# Patient Record
Sex: MALE | Race: WHITE | NOT HISPANIC OR LATINO | Employment: OTHER | URBAN - METROPOLITAN AREA
[De-identification: names, ages, dates, MRNs, and addresses within clinical notes are randomized per-mention and may not be internally consistent; named-entity substitution may affect disease eponyms.]

---

## 2017-01-10 ENCOUNTER — FOLLOW UP AND POST INJECTION EVALUATION (OUTPATIENT)
Dept: URBAN - METROPOLITAN AREA CLINIC 26 | Facility: CLINIC | Age: 82
End: 2017-01-10

## 2017-01-10 VITALS
HEART RATE: 59 BPM | BODY MASS INDEX: 23.35 KG/M2 | SYSTOLIC BLOOD PRESSURE: 115 MMHG | HEIGHT: 60 IN | DIASTOLIC BLOOD PRESSURE: 81 MMHG

## 2017-01-10 DIAGNOSIS — H04.123: ICD-10-CM

## 2017-01-10 DIAGNOSIS — H02.836: ICD-10-CM

## 2017-01-10 DIAGNOSIS — H25.12: ICD-10-CM

## 2017-01-10 DIAGNOSIS — H02.833: ICD-10-CM

## 2017-01-10 DIAGNOSIS — H35.3122: ICD-10-CM

## 2017-01-10 DIAGNOSIS — H18.59: ICD-10-CM

## 2017-01-10 DIAGNOSIS — H35.3211: ICD-10-CM

## 2017-01-10 PROCEDURE — 92250 FUNDUS PHOTOGRAPHY W/I&R: CPT

## 2017-01-10 PROCEDURE — 67028 INJECTION EYE DRUG: CPT

## 2017-01-10 PROCEDURE — 92235 FLUORESCEIN ANGRPH MLTIFRAME: CPT

## 2017-01-10 PROCEDURE — G8420 CALC BMI NORM PARAMETERS: HCPCS

## 2017-01-10 PROCEDURE — 4177F TALK PT/CRGVR RE AREDS PREV: CPT

## 2017-01-10 PROCEDURE — 1036F TOBACCO NON-USER: CPT

## 2017-01-10 PROCEDURE — 92014 COMPRE OPH EXAM EST PT 1/>: CPT

## 2017-01-10 PROCEDURE — 2019F DILATED MACUL EXAM DONE: CPT

## 2017-01-10 ASSESSMENT — TONOMETRY
OS_IOP_MMHG: 13
OD_IOP_MMHG: 9

## 2017-01-10 ASSESSMENT — VISUAL ACUITY
OS_SC: 20/30+1
OD_SC: 20/25-3

## 2017-03-07 ENCOUNTER — CLINIC PROCEDURE ONLY (OUTPATIENT)
Dept: URBAN - METROPOLITAN AREA CLINIC 26 | Facility: CLINIC | Age: 82
End: 2017-03-07

## 2017-03-07 DIAGNOSIS — H35.3211: ICD-10-CM

## 2017-03-07 PROCEDURE — 67028 INJECTION EYE DRUG: CPT

## 2017-03-07 ASSESSMENT — VISUAL ACUITY: OD_SC: 20/25

## 2017-03-07 ASSESSMENT — TONOMETRY: OD_IOP_MMHG: 15

## 2017-05-01 ENCOUNTER — FOLLOW UP (OUTPATIENT)
Dept: URBAN - METROPOLITAN AREA CLINIC 26 | Facility: CLINIC | Age: 82
End: 2017-05-01

## 2017-05-01 VITALS — HEART RATE: 60 BPM | SYSTOLIC BLOOD PRESSURE: 131 MMHG | DIASTOLIC BLOOD PRESSURE: 58 MMHG | HEIGHT: 60 IN

## 2017-05-01 DIAGNOSIS — H18.59: ICD-10-CM

## 2017-05-01 DIAGNOSIS — H25.12: ICD-10-CM

## 2017-05-01 DIAGNOSIS — H35.3211: ICD-10-CM

## 2017-05-01 DIAGNOSIS — H35.3122: ICD-10-CM

## 2017-05-01 DIAGNOSIS — H02.836: ICD-10-CM

## 2017-05-01 DIAGNOSIS — H02.833: ICD-10-CM

## 2017-05-01 DIAGNOSIS — H04.123: ICD-10-CM

## 2017-05-01 PROCEDURE — 2019F DILATED MACUL EXAM DONE: CPT

## 2017-05-01 PROCEDURE — 92014 COMPRE OPH EXAM EST PT 1/>: CPT

## 2017-05-01 PROCEDURE — 1036F TOBACCO NON-USER: CPT

## 2017-05-01 PROCEDURE — 92235 FLUORESCEIN ANGRPH MLTIFRAME: CPT

## 2017-05-01 PROCEDURE — 92250 FUNDUS PHOTOGRAPHY W/I&R: CPT

## 2017-05-01 PROCEDURE — 4177F TALK PT/CRGVR RE AREDS PREV: CPT

## 2017-05-01 PROCEDURE — G8427 DOCREV CUR MEDS BY ELIG CLIN: HCPCS

## 2017-05-01 PROCEDURE — 67028 INJECTION EYE DRUG: CPT

## 2017-05-01 ASSESSMENT — TONOMETRY
OS_IOP_MMHG: 13
OD_IOP_MMHG: 10

## 2017-05-01 ASSESSMENT — VISUAL ACUITY
OD_SC: 20/25
OS_SC: 20/25-1

## 2017-06-21 ENCOUNTER — CLINIC PROCEDURE ONLY (OUTPATIENT)
Dept: URBAN - METROPOLITAN AREA CLINIC 26 | Facility: CLINIC | Age: 82
End: 2017-06-21

## 2017-06-21 DIAGNOSIS — H35.3211: ICD-10-CM

## 2017-06-21 PROCEDURE — 67028 INJECTION EYE DRUG: CPT

## 2017-06-21 ASSESSMENT — VISUAL ACUITY: OD_SC: 20/20-3

## 2017-06-21 ASSESSMENT — TONOMETRY: OD_IOP_MMHG: 12

## 2017-08-17 ENCOUNTER — FOLLOW UP AND POST INJECTION EVALUATION (OUTPATIENT)
Dept: URBAN - METROPOLITAN AREA CLINIC 26 | Facility: CLINIC | Age: 82
End: 2017-08-17

## 2017-08-17 VITALS — HEIGHT: 60 IN | DIASTOLIC BLOOD PRESSURE: 66 MMHG | HEART RATE: 61 BPM | SYSTOLIC BLOOD PRESSURE: 124 MMHG

## 2017-08-17 DIAGNOSIS — H35.3211: ICD-10-CM

## 2017-08-17 DIAGNOSIS — H18.59: ICD-10-CM

## 2017-08-17 DIAGNOSIS — H35.3122: ICD-10-CM

## 2017-08-17 PROCEDURE — 92014 COMPRE OPH EXAM EST PT 1/>: CPT

## 2017-08-17 PROCEDURE — 1036F TOBACCO NON-USER: CPT

## 2017-08-17 PROCEDURE — 67028 INJECTION EYE DRUG: CPT

## 2017-08-17 PROCEDURE — 2019F DILATED MACUL EXAM DONE: CPT

## 2017-08-17 PROCEDURE — 4177F TALK PT/CRGVR RE AREDS PREV: CPT

## 2017-08-17 PROCEDURE — 92250 FUNDUS PHOTOGRAPHY W/I&R: CPT

## 2017-08-17 PROCEDURE — G8427 DOCREV CUR MEDS BY ELIG CLIN: HCPCS

## 2017-08-17 ASSESSMENT — VISUAL ACUITY
OD_SC: 20/20-1
OS_SC: 20/25-1

## 2017-08-17 ASSESSMENT — TONOMETRY
OD_IOP_MMHG: 7
OS_IOP_MMHG: 9

## 2017-10-30 ENCOUNTER — CLINICAL PROCEDURE AND DIAGNOSTIC TESTING ONLY (OUTPATIENT)
Dept: URBAN - METROPOLITAN AREA CLINIC 26 | Facility: CLINIC | Age: 82
End: 2017-10-30

## 2017-10-30 DIAGNOSIS — H35.3122: ICD-10-CM

## 2017-10-30 DIAGNOSIS — H35.3211: ICD-10-CM

## 2017-10-30 PROCEDURE — 92134 CPTRZ OPH DX IMG PST SGM RTA: CPT

## 2017-10-30 PROCEDURE — 67028 INJECTION EYE DRUG: CPT

## 2017-10-30 ASSESSMENT — TONOMETRY: OD_IOP_MMHG: 9

## 2017-10-30 ASSESSMENT — VISUAL ACUITY: OD_SC: 20/25

## 2018-01-22 ENCOUNTER — FOLLOW UP AND POST INJECTION EVALUATION (OUTPATIENT)
Dept: URBAN - METROPOLITAN AREA CLINIC 26 | Facility: CLINIC | Age: 83
End: 2018-01-22

## 2018-01-22 VITALS
HEART RATE: 60 BPM | SYSTOLIC BLOOD PRESSURE: 103 MMHG | DIASTOLIC BLOOD PRESSURE: 71 MMHG | HEIGHT: 74 IN | WEIGHT: 175 LBS | BODY MASS INDEX: 22.46 KG/M2

## 2018-01-22 DIAGNOSIS — H02.836: ICD-10-CM

## 2018-01-22 DIAGNOSIS — H35.3211: ICD-10-CM

## 2018-01-22 DIAGNOSIS — H18.59: ICD-10-CM

## 2018-01-22 DIAGNOSIS — H04.123: ICD-10-CM

## 2018-01-22 DIAGNOSIS — H02.833: ICD-10-CM

## 2018-01-22 DIAGNOSIS — H25.12: ICD-10-CM

## 2018-01-22 DIAGNOSIS — H35.3122: ICD-10-CM

## 2018-01-22 PROCEDURE — 92134 CPTRZ OPH DX IMG PST SGM RTA: CPT

## 2018-01-22 PROCEDURE — 67028 INJECTION EYE DRUG: CPT

## 2018-01-22 PROCEDURE — 92014 COMPRE OPH EXAM EST PT 1/>: CPT

## 2018-01-22 PROCEDURE — 92235 FLUORESCEIN ANGRPH MLTIFRAME: CPT

## 2018-01-22 PROCEDURE — 92250 FUNDUS PHOTOGRAPHY W/I&R: CPT

## 2018-01-22 ASSESSMENT — TONOMETRY
OS_IOP_MMHG: 8
OD_IOP_MMHG: 8

## 2018-01-22 ASSESSMENT — VISUAL ACUITY
OD_SC: 20/20-2
OS_SC: 20/20-1

## 2018-04-16 ENCOUNTER — CLINICAL PROCEDURE AND DIAGNOSTIC TESTING ONLY (OUTPATIENT)
Dept: URBAN - METROPOLITAN AREA CLINIC 26 | Facility: CLINIC | Age: 83
End: 2018-04-16

## 2018-04-16 DIAGNOSIS — H35.3211: ICD-10-CM

## 2018-04-16 DIAGNOSIS — H35.3122: ICD-10-CM

## 2018-04-16 PROCEDURE — 92134 CPTRZ OPH DX IMG PST SGM RTA: CPT

## 2018-04-16 PROCEDURE — 67028 INJECTION EYE DRUG: CPT

## 2018-04-16 PROCEDURE — 92250 FUNDUS PHOTOGRAPHY W/I&R: CPT

## 2018-04-16 ASSESSMENT — VISUAL ACUITY: OD_SC: 20/30-

## 2018-04-16 ASSESSMENT — TONOMETRY: OD_IOP_MMHG: 15

## 2018-07-09 ENCOUNTER — FOLLOW UP AND POST INJECTION EVALUATION (OUTPATIENT)
Dept: URBAN - METROPOLITAN AREA CLINIC 26 | Facility: CLINIC | Age: 83
End: 2018-07-09

## 2018-07-09 VITALS — HEART RATE: 60 BPM | DIASTOLIC BLOOD PRESSURE: 61 MMHG | HEIGHT: 60 IN | SYSTOLIC BLOOD PRESSURE: 117 MMHG

## 2018-07-09 DIAGNOSIS — H02.833: ICD-10-CM

## 2018-07-09 DIAGNOSIS — H04.123: ICD-10-CM

## 2018-07-09 DIAGNOSIS — H18.59: ICD-10-CM

## 2018-07-09 DIAGNOSIS — H35.3211: ICD-10-CM

## 2018-07-09 DIAGNOSIS — H02.836: ICD-10-CM

## 2018-07-09 DIAGNOSIS — H35.3122: ICD-10-CM

## 2018-07-09 DIAGNOSIS — H25.12: ICD-10-CM

## 2018-07-09 PROCEDURE — 92250 FUNDUS PHOTOGRAPHY W/I&R: CPT

## 2018-07-09 PROCEDURE — 92235 FLUORESCEIN ANGRPH MLTIFRAME: CPT

## 2018-07-09 PROCEDURE — 92014 COMPRE OPH EXAM EST PT 1/>: CPT

## 2018-07-09 PROCEDURE — 92134 CPTRZ OPH DX IMG PST SGM RTA: CPT

## 2018-07-09 PROCEDURE — 67028 INJECTION EYE DRUG: CPT

## 2018-07-09 ASSESSMENT — VISUAL ACUITY
OD_SC: 20/20
OS_SC: 20/25

## 2018-07-09 ASSESSMENT — TONOMETRY
OS_IOP_MMHG: 9
OD_IOP_MMHG: 10

## 2018-09-17 ENCOUNTER — CLINICAL PROCEDURE AND DIAGNOSTIC TESTING ONLY (OUTPATIENT)
Dept: URBAN - METROPOLITAN AREA CLINIC 26 | Facility: CLINIC | Age: 83
End: 2018-09-17

## 2018-09-17 DIAGNOSIS — H35.3122: ICD-10-CM

## 2018-09-17 DIAGNOSIS — H35.3211: ICD-10-CM

## 2018-09-17 PROCEDURE — 92250 FUNDUS PHOTOGRAPHY W/I&R: CPT

## 2018-09-17 PROCEDURE — 92134 CPTRZ OPH DX IMG PST SGM RTA: CPT

## 2018-09-17 PROCEDURE — 67028 INJECTION EYE DRUG: CPT

## 2018-09-17 ASSESSMENT — TONOMETRY: OD_IOP_MMHG: 15

## 2018-09-17 ASSESSMENT — VISUAL ACUITY: OD_SC: 20/20-

## 2018-11-26 ENCOUNTER — CLINICAL PROCEDURE AND DIAGNOSTIC TESTING ONLY (OUTPATIENT)
Dept: URBAN - METROPOLITAN AREA CLINIC 26 | Facility: CLINIC | Age: 83
End: 2018-11-26

## 2018-11-26 DIAGNOSIS — H35.3211: ICD-10-CM

## 2018-11-26 DIAGNOSIS — H35.3122: ICD-10-CM

## 2018-11-26 PROCEDURE — 67028 INJECTION EYE DRUG: CPT

## 2018-11-26 PROCEDURE — 92250 FUNDUS PHOTOGRAPHY W/I&R: CPT

## 2018-11-26 ASSESSMENT — VISUAL ACUITY: OD_SC: 20/20

## 2018-11-26 ASSESSMENT — TONOMETRY: OD_IOP_MMHG: 14

## 2019-02-05 ENCOUNTER — FOLLOW UP AND POST INJECTION EVALUATION (OUTPATIENT)
Dept: URBAN - METROPOLITAN AREA CLINIC 26 | Facility: CLINIC | Age: 84
End: 2019-02-05

## 2019-02-05 VITALS — WEIGHT: 173 LBS | HEIGHT: 73 IN | BODY MASS INDEX: 22.93 KG/M2

## 2019-02-05 DIAGNOSIS — H02.833: ICD-10-CM

## 2019-02-05 DIAGNOSIS — H02.836: ICD-10-CM

## 2019-02-05 DIAGNOSIS — H04.123: ICD-10-CM

## 2019-02-05 DIAGNOSIS — H18.59: ICD-10-CM

## 2019-02-05 DIAGNOSIS — H35.3211: ICD-10-CM

## 2019-02-05 DIAGNOSIS — H35.3122: ICD-10-CM

## 2019-02-05 DIAGNOSIS — H25.12: ICD-10-CM

## 2019-02-05 PROCEDURE — 92250 FUNDUS PHOTOGRAPHY W/I&R: CPT

## 2019-02-05 PROCEDURE — 92134 CPTRZ OPH DX IMG PST SGM RTA: CPT

## 2019-02-05 PROCEDURE — 67028 INJECTION EYE DRUG: CPT

## 2019-02-05 PROCEDURE — 92014 COMPRE OPH EXAM EST PT 1/>: CPT

## 2019-02-05 ASSESSMENT — TONOMETRY
OS_IOP_MMHG: 10
OD_IOP_MMHG: 11

## 2019-02-05 ASSESSMENT — VISUAL ACUITY
OS_SC: 20/20-
OD_SC: 20/25+2

## 2019-04-16 ENCOUNTER — CLINICAL PROCEDURE AND DIAGNOSTIC TESTING ONLY (OUTPATIENT)
Dept: URBAN - METROPOLITAN AREA CLINIC 26 | Facility: CLINIC | Age: 84
End: 2019-04-16

## 2019-04-16 DIAGNOSIS — H35.3211: ICD-10-CM

## 2019-04-16 DIAGNOSIS — H35.3122: ICD-10-CM

## 2019-04-16 PROCEDURE — 92250 FUNDUS PHOTOGRAPHY W/I&R: CPT

## 2019-04-16 PROCEDURE — 92134 CPTRZ OPH DX IMG PST SGM RTA: CPT

## 2019-04-16 PROCEDURE — 67028 INJECTION EYE DRUG: CPT

## 2019-04-16 ASSESSMENT — VISUAL ACUITY
OS_SC: 20/25
OD_SC: 20/25

## 2019-04-16 ASSESSMENT — TONOMETRY
OD_IOP_MMHG: 10
OS_IOP_MMHG: 12

## 2019-07-02 ENCOUNTER — CLINICAL PROCEDURE AND DIAGNOSTIC TESTING ONLY (OUTPATIENT)
Dept: URBAN - METROPOLITAN AREA CLINIC 26 | Facility: CLINIC | Age: 84
End: 2019-07-02

## 2019-07-02 DIAGNOSIS — H35.3122: ICD-10-CM

## 2019-07-02 DIAGNOSIS — H35.3211: ICD-10-CM

## 2019-07-02 PROCEDURE — 92250 FUNDUS PHOTOGRAPHY W/I&R: CPT

## 2019-07-02 PROCEDURE — 67028 INJECTION EYE DRUG: CPT

## 2019-07-02 ASSESSMENT — VISUAL ACUITY: OD_SC: 20/20-2

## 2019-07-02 ASSESSMENT — TONOMETRY: OD_IOP_MMHG: 10

## 2019-09-11 ENCOUNTER — FOLLOW UP AND POST INJECTION EVALUATION (OUTPATIENT)
Dept: URBAN - METROPOLITAN AREA CLINIC 26 | Facility: CLINIC | Age: 84
End: 2019-09-11

## 2019-09-11 VITALS — DIASTOLIC BLOOD PRESSURE: 62 MMHG | HEIGHT: 60 IN | SYSTOLIC BLOOD PRESSURE: 116 MMHG | HEART RATE: 60 BPM

## 2019-09-11 DIAGNOSIS — H25.12: ICD-10-CM

## 2019-09-11 DIAGNOSIS — H02.833: ICD-10-CM

## 2019-09-11 DIAGNOSIS — H35.3122: ICD-10-CM

## 2019-09-11 DIAGNOSIS — H04.123: ICD-10-CM

## 2019-09-11 DIAGNOSIS — H18.59: ICD-10-CM

## 2019-09-11 DIAGNOSIS — H02.836: ICD-10-CM

## 2019-09-11 DIAGNOSIS — H35.3211: ICD-10-CM

## 2019-09-11 PROCEDURE — 92235 FLUORESCEIN ANGRPH MLTIFRAME: CPT

## 2019-09-11 PROCEDURE — 92250 FUNDUS PHOTOGRAPHY W/I&R: CPT

## 2019-09-11 PROCEDURE — 67028 INJECTION EYE DRUG: CPT

## 2019-09-11 PROCEDURE — 92014 COMPRE OPH EXAM EST PT 1/>: CPT

## 2019-09-11 PROCEDURE — 92134 CPTRZ OPH DX IMG PST SGM RTA: CPT

## 2019-09-11 ASSESSMENT — VISUAL ACUITY
OS_SC: 20/20-1
OD_SC: 20/20+2

## 2019-09-11 ASSESSMENT — TONOMETRY
OS_IOP_MMHG: 14
OD_IOP_MMHG: 18

## 2019-11-25 ENCOUNTER — CLINICAL PROCEDURE AND DIAGNOSTIC TESTING ONLY (OUTPATIENT)
Dept: URBAN - METROPOLITAN AREA CLINIC 26 | Facility: CLINIC | Age: 84
End: 2019-11-25

## 2019-11-25 DIAGNOSIS — H35.3122: ICD-10-CM

## 2019-11-25 DIAGNOSIS — H35.3211: ICD-10-CM

## 2019-11-25 PROCEDURE — 92250 FUNDUS PHOTOGRAPHY W/I&R: CPT

## 2019-11-25 PROCEDURE — 67028 INJECTION EYE DRUG: CPT

## 2019-11-25 PROCEDURE — 92134 CPTRZ OPH DX IMG PST SGM RTA: CPT

## 2019-11-25 ASSESSMENT — VISUAL ACUITY: OD_SC: 20/20-2

## 2019-11-25 ASSESSMENT — TONOMETRY: OD_IOP_MMHG: 17

## 2020-02-03 ENCOUNTER — CLINICAL PROCEDURE AND DIAGNOSTIC TESTING ONLY (OUTPATIENT)
Dept: URBAN - METROPOLITAN AREA CLINIC 26 | Facility: CLINIC | Age: 85
End: 2020-02-03

## 2020-02-03 DIAGNOSIS — H35.3122: ICD-10-CM

## 2020-02-03 DIAGNOSIS — H35.3211: ICD-10-CM

## 2020-02-03 PROCEDURE — 67028 INJECTION EYE DRUG: CPT

## 2020-02-03 PROCEDURE — 92134 CPTRZ OPH DX IMG PST SGM RTA: CPT

## 2020-02-03 PROCEDURE — 92250 FUNDUS PHOTOGRAPHY W/I&R: CPT

## 2020-02-03 ASSESSMENT — VISUAL ACUITY: OD_CC: 20/20

## 2020-02-03 ASSESSMENT — TONOMETRY: OD_IOP_MMHG: 13

## 2020-04-16 ENCOUNTER — FOLLOW UP AND POST INJECTION EVALUATION (OUTPATIENT)
Dept: URBAN - METROPOLITAN AREA CLINIC 26 | Facility: CLINIC | Age: 85
End: 2020-04-16

## 2020-04-16 VITALS — WEIGHT: 160 LBS | BODY MASS INDEX: 21.2 KG/M2 | HEIGHT: 73 IN

## 2020-04-16 DIAGNOSIS — H18.59: ICD-10-CM

## 2020-04-16 DIAGNOSIS — H35.3122: ICD-10-CM

## 2020-04-16 DIAGNOSIS — G45.3: ICD-10-CM

## 2020-04-16 DIAGNOSIS — H35.3211: ICD-10-CM

## 2020-04-16 PROCEDURE — 67028 INJECTION EYE DRUG: CPT

## 2020-04-16 PROCEDURE — 92134 CPTRZ OPH DX IMG PST SGM RTA: CPT

## 2020-04-16 PROCEDURE — 92014 COMPRE OPH EXAM EST PT 1/>: CPT

## 2020-04-16 PROCEDURE — 92250 FUNDUS PHOTOGRAPHY W/I&R: CPT

## 2020-04-16 ASSESSMENT — TONOMETRY
OD_IOP_MMHG: 14
OS_IOP_MMHG: 10

## 2020-04-16 ASSESSMENT — VISUAL ACUITY
OS_SC: 20/20-2
OD_SC: 20/20-2

## 2020-06-08 ENCOUNTER — CLINICAL PROCEDURE AND DIAGNOSTIC TESTING ONLY (OUTPATIENT)
Dept: URBAN - METROPOLITAN AREA CLINIC 26 | Facility: CLINIC | Age: 85
End: 2020-06-08

## 2020-06-08 ENCOUNTER — ADDENDUM (OUTPATIENT)
Dept: URBAN - METROPOLITAN AREA CLINIC 26 | Facility: CLINIC | Age: 85
End: 2020-06-08

## 2020-06-08 DIAGNOSIS — H35.3211: ICD-10-CM

## 2020-06-08 DIAGNOSIS — H35.3122: ICD-10-CM

## 2020-06-08 PROCEDURE — 92250 FUNDUS PHOTOGRAPHY W/I&R: CPT

## 2020-06-08 PROCEDURE — 67028 INJECTION EYE DRUG: CPT

## 2020-06-08 ASSESSMENT — VISUAL ACUITY: OD_SC: 20/20-

## 2020-06-08 ASSESSMENT — TONOMETRY: OD_IOP_MMHG: 16

## 2020-06-26 NOTE — PATIENT DISCUSSION
Proceed with OD first. EYE OD, IOL TYPE Toric lens, POST OPERATIVE TARGET PL/-0.50, PACKAGE Custom w/ Istent.

## 2020-06-26 NOTE — PATIENT DISCUSSION
consider OS to follow. EYE OS, IOL TYPE Toric lens, POST OPERATIVE TARGET PL/-0.50, PACKAGE Custom w/ Istent.

## 2020-06-26 NOTE — PATIENT DISCUSSION
Recommend Istent at time of cataract surgery OU to further protect optic nerve and better control IOP. Will consider discontinuing Glaucoma drops after surgery if IOP is stable; will continue Glaucoma drops until post op drops are completed.

## 2020-07-21 NOTE — PATIENT DISCUSSION
Continue lumigan qhs OU. Advised to stop combigan 1 week prior to next visit. Drop instructions written and given to pt.

## 2020-08-17 ENCOUNTER — CLINICAL PROCEDURE AND DIAGNOSTIC TESTING ONLY (OUTPATIENT)
Dept: URBAN - METROPOLITAN AREA CLINIC 26 | Facility: CLINIC | Age: 85
End: 2020-08-17

## 2020-08-17 DIAGNOSIS — H35.3211: ICD-10-CM

## 2020-08-17 DIAGNOSIS — H35.3122: ICD-10-CM

## 2020-08-17 PROCEDURE — 67028 INJECTION EYE DRUG: CPT

## 2020-08-17 PROCEDURE — 92134 CPTRZ OPH DX IMG PST SGM RTA: CPT

## 2020-08-17 PROCEDURE — 92250 FUNDUS PHOTOGRAPHY W/I&R: CPT

## 2020-08-17 ASSESSMENT — TONOMETRY: OD_IOP_MMHG: 16

## 2020-08-17 ASSESSMENT — VISUAL ACUITY: OD_SC: 20/25+2

## 2020-10-27 ENCOUNTER — FOLLOW UP AND POST INJECTION EVALUATION (OUTPATIENT)
Dept: URBAN - METROPOLITAN AREA CLINIC 26 | Facility: CLINIC | Age: 85
End: 2020-10-27

## 2020-10-27 DIAGNOSIS — H04.123: ICD-10-CM

## 2020-10-27 DIAGNOSIS — G45.3: ICD-10-CM

## 2020-10-27 DIAGNOSIS — H35.3211: ICD-10-CM

## 2020-10-27 DIAGNOSIS — H02.833: ICD-10-CM

## 2020-10-27 DIAGNOSIS — H02.836: ICD-10-CM

## 2020-10-27 DIAGNOSIS — H18.599: ICD-10-CM

## 2020-10-27 DIAGNOSIS — H25.12: ICD-10-CM

## 2020-10-27 DIAGNOSIS — H35.3122: ICD-10-CM

## 2020-10-27 PROCEDURE — 67028 INJECTION EYE DRUG: CPT

## 2020-10-27 PROCEDURE — 92134 CPTRZ OPH DX IMG PST SGM RTA: CPT

## 2020-10-27 PROCEDURE — 92014 COMPRE OPH EXAM EST PT 1/>: CPT

## 2020-10-27 PROCEDURE — 92250 FUNDUS PHOTOGRAPHY W/I&R: CPT

## 2020-10-27 ASSESSMENT — VISUAL ACUITY
OD_SC: 20/20
OS_SC: 20/20-1

## 2020-10-27 ASSESSMENT — TONOMETRY
OS_IOP_MMHG: 15
OD_IOP_MMHG: 17

## 2021-01-07 ENCOUNTER — CLINICAL PROCEDURE AND DIAGNOSTIC TESTING ONLY (OUTPATIENT)
Dept: URBAN - METROPOLITAN AREA CLINIC 26 | Facility: CLINIC | Age: 86
End: 2021-01-07

## 2021-01-07 DIAGNOSIS — H35.3211: ICD-10-CM

## 2021-01-07 DIAGNOSIS — G45.3: ICD-10-CM

## 2021-01-07 DIAGNOSIS — H35.3122: ICD-10-CM

## 2021-01-07 PROCEDURE — 67028 INJECTION EYE DRUG: CPT

## 2021-01-07 PROCEDURE — 92134 CPTRZ OPH DX IMG PST SGM RTA: CPT

## 2021-01-07 PROCEDURE — 92250 FUNDUS PHOTOGRAPHY W/I&R: CPT

## 2021-01-07 ASSESSMENT — TONOMETRY: OD_IOP_MMHG: 13

## 2021-01-07 ASSESSMENT — VISUAL ACUITY: OD_SC: 20/25-2

## 2021-03-18 ENCOUNTER — CLINICAL PROCEDURE AND DIAGNOSTIC TESTING ONLY (OUTPATIENT)
Dept: URBAN - METROPOLITAN AREA CLINIC 26 | Facility: CLINIC | Age: 86
End: 2021-03-18

## 2021-03-18 VITALS — HEIGHT: 73 IN | BODY MASS INDEX: 22.53 KG/M2 | WEIGHT: 170 LBS

## 2021-03-18 DIAGNOSIS — H35.3211: ICD-10-CM

## 2021-03-18 DIAGNOSIS — H35.3122: ICD-10-CM

## 2021-03-18 PROCEDURE — 92134 CPTRZ OPH DX IMG PST SGM RTA: CPT

## 2021-03-18 PROCEDURE — 92250 FUNDUS PHOTOGRAPHY W/I&R: CPT

## 2021-03-18 PROCEDURE — 67028 INJECTION EYE DRUG: CPT

## 2021-03-18 ASSESSMENT — TONOMETRY: OD_IOP_MMHG: 09

## 2021-03-18 ASSESSMENT — VISUAL ACUITY: OD_SC: 20/25-1

## 2021-06-03 ENCOUNTER — FOLLOW UP AND POST INJECTION EVALUATION (OUTPATIENT)
Dept: URBAN - METROPOLITAN AREA CLINIC 26 | Facility: CLINIC | Age: 86
End: 2021-06-03

## 2021-06-03 DIAGNOSIS — H35.3122: ICD-10-CM

## 2021-06-03 DIAGNOSIS — G45.3: ICD-10-CM

## 2021-06-03 DIAGNOSIS — H35.3211: ICD-10-CM

## 2021-06-03 DIAGNOSIS — H40.1111: ICD-10-CM

## 2021-06-03 DIAGNOSIS — H18.599: ICD-10-CM

## 2021-06-03 PROCEDURE — 92134 CPTRZ OPH DX IMG PST SGM RTA: CPT

## 2021-06-03 PROCEDURE — 67028 INJECTION EYE DRUG: CPT

## 2021-06-03 PROCEDURE — 92014 COMPRE OPH EXAM EST PT 1/>: CPT

## 2021-06-03 PROCEDURE — 92250 FUNDUS PHOTOGRAPHY W/I&R: CPT

## 2021-06-03 ASSESSMENT — VISUAL ACUITY
OS_SC: 20/25
OD_SC: 20/25+1

## 2021-06-03 ASSESSMENT — TONOMETRY
OD_IOP_MMHG: 12
OS_IOP_MMHG: 13

## 2021-08-12 ENCOUNTER — CLINICAL PROCEDURE AND DIAGNOSTIC TESTING ONLY (OUTPATIENT)
Dept: URBAN - METROPOLITAN AREA CLINIC 26 | Facility: CLINIC | Age: 86
End: 2021-08-12

## 2021-08-12 DIAGNOSIS — H35.3211: ICD-10-CM

## 2021-08-12 DIAGNOSIS — H35.3122: ICD-10-CM

## 2021-08-12 PROCEDURE — 67028 INJECTION EYE DRUG: CPT

## 2021-08-12 PROCEDURE — 92250 FUNDUS PHOTOGRAPHY W/I&R: CPT

## 2021-08-12 PROCEDURE — 92134 CPTRZ OPH DX IMG PST SGM RTA: CPT

## 2021-08-12 ASSESSMENT — VISUAL ACUITY: OD_SC: 20/20-2

## 2021-08-12 ASSESSMENT — TONOMETRY: OD_IOP_MMHG: 6

## 2021-10-21 ENCOUNTER — CLINICAL PROCEDURE AND DIAGNOSTIC TESTING ONLY (OUTPATIENT)
Dept: URBAN - METROPOLITAN AREA CLINIC 26 | Facility: CLINIC | Age: 86
End: 2021-10-21

## 2021-10-21 DIAGNOSIS — H35.3211: ICD-10-CM

## 2021-10-21 DIAGNOSIS — H35.3122: ICD-10-CM

## 2021-10-21 PROCEDURE — 67028 INJECTION EYE DRUG: CPT

## 2021-10-21 PROCEDURE — 92134 CPTRZ OPH DX IMG PST SGM RTA: CPT

## 2021-10-21 PROCEDURE — 92250 FUNDUS PHOTOGRAPHY W/I&R: CPT

## 2021-10-21 ASSESSMENT — TONOMETRY: OD_IOP_MMHG: 12

## 2021-10-21 ASSESSMENT — VISUAL ACUITY: OD_SC: 20/20-2

## 2021-11-16 NOTE — PATIENT DISCUSSION
11/16/21: SLIGHT PROGRESSION ON OCT. VA REMAINS UNCHANGED. WE'LL CONT MONITORING FOR SX AND AG. CALL ASAP IF CHANGES ARISE.

## 2022-01-04 ENCOUNTER — FOLLOW UP (OUTPATIENT)
Dept: URBAN - METROPOLITAN AREA CLINIC 26 | Facility: CLINIC | Age: 87
End: 2022-01-04

## 2022-01-04 VITALS
DIASTOLIC BLOOD PRESSURE: 67 MMHG | SYSTOLIC BLOOD PRESSURE: 123 MMHG | BODY MASS INDEX: 21.87 KG/M2 | WEIGHT: 165 LBS | HEIGHT: 73 IN | HEART RATE: 60 BPM

## 2022-01-04 DIAGNOSIS — H18.599: ICD-10-CM

## 2022-01-04 DIAGNOSIS — H35.3122: ICD-10-CM

## 2022-01-04 DIAGNOSIS — G45.3: ICD-10-CM

## 2022-01-04 DIAGNOSIS — H04.123: ICD-10-CM

## 2022-01-04 DIAGNOSIS — H35.3211: ICD-10-CM

## 2022-01-04 DIAGNOSIS — H40.1111: ICD-10-CM

## 2022-01-04 PROCEDURE — 92134 CPTRZ OPH DX IMG PST SGM RTA: CPT

## 2022-01-04 PROCEDURE — 67028 INJECTION EYE DRUG: CPT

## 2022-01-04 PROCEDURE — 92014 COMPRE OPH EXAM EST PT 1/>: CPT

## 2022-01-04 PROCEDURE — 92235 FLUORESCEIN ANGRPH MLTIFRAME: CPT

## 2022-01-04 PROCEDURE — 92250 FUNDUS PHOTOGRAPHY W/I&R: CPT

## 2022-01-04 ASSESSMENT — TONOMETRY
OS_IOP_MMHG: 11
OD_IOP_MMHG: 9
OS_IOP_MMHG: 9

## 2022-04-01 ENCOUNTER — CLINIC PROCEDURE ONLY (OUTPATIENT)
Dept: URBAN - METROPOLITAN AREA CLINIC 26 | Facility: CLINIC | Age: 87
End: 2022-04-01

## 2022-04-01 DIAGNOSIS — G45.3: ICD-10-CM

## 2022-04-01 DIAGNOSIS — H35.3211: ICD-10-CM

## 2022-04-01 DIAGNOSIS — H35.3122: ICD-10-CM

## 2022-04-01 PROCEDURE — 67028 INJECTION EYE DRUG: CPT

## 2022-04-01 PROCEDURE — 92250 FUNDUS PHOTOGRAPHY W/I&R: CPT

## 2022-04-01 PROCEDURE — 92134 CPTRZ OPH DX IMG PST SGM RTA: CPT

## 2022-04-01 ASSESSMENT — TONOMETRY: OD_IOP_MMHG: 10

## 2022-06-10 ENCOUNTER — CLINIC PROCEDURE ONLY (OUTPATIENT)
Dept: URBAN - METROPOLITAN AREA CLINIC 26 | Facility: CLINIC | Age: 87
End: 2022-06-10

## 2022-06-10 DIAGNOSIS — H35.3122: ICD-10-CM

## 2022-06-10 DIAGNOSIS — H35.3211: ICD-10-CM

## 2022-06-10 PROCEDURE — 92250 FUNDUS PHOTOGRAPHY W/I&R: CPT

## 2022-06-10 PROCEDURE — 67028 INJECTION EYE DRUG: CPT

## 2022-06-10 PROCEDURE — 92134 CPTRZ OPH DX IMG PST SGM RTA: CPT

## 2022-06-21 NOTE — PATIENT DISCUSSION
IOP stable today OS , elevating OD  .  Glaucoma laser surgery is recommended. The risks, benefits, and alternatives to SLT were discussed. The patient elects to proceed with surgery.

## 2022-06-24 NOTE — PATIENT DISCUSSION
6/24/22: UNCHANGED ON OCT.  PT REPORTS NO SX. CONT MONITORING 4 MO OR SOONER PRN GIVEN H/O GLAUCOM AND ASSOCIATED RISK W SX.

## 2022-08-15 NOTE — PATIENT DISCUSSION
IOP stable today OS / OD .  Glaucoma laser surgery SLT completed. Continue to monitor IOP after SLT.

## 2022-08-29 ENCOUNTER — FOLLOW UP (OUTPATIENT)
Dept: URBAN - METROPOLITAN AREA CLINIC 26 | Facility: CLINIC | Age: 87
End: 2022-08-29

## 2022-08-29 DIAGNOSIS — H18.599: ICD-10-CM

## 2022-08-29 DIAGNOSIS — H35.3122: ICD-10-CM

## 2022-08-29 DIAGNOSIS — G45.3: ICD-10-CM

## 2022-08-29 DIAGNOSIS — H40.1111: ICD-10-CM

## 2022-08-29 DIAGNOSIS — H35.3211: ICD-10-CM

## 2022-08-29 DIAGNOSIS — H04.123: ICD-10-CM

## 2022-08-29 PROCEDURE — 67028 INJECTION EYE DRUG: CPT

## 2022-08-29 PROCEDURE — 92250 FUNDUS PHOTOGRAPHY W/I&R: CPT

## 2022-08-29 PROCEDURE — 92014 COMPRE OPH EXAM EST PT 1/>: CPT

## 2022-08-29 PROCEDURE — 92134 CPTRZ OPH DX IMG PST SGM RTA: CPT

## 2022-08-29 ASSESSMENT — VISUAL ACUITY
OD_SC: 20/25-1
OS_SC: 20/20-2

## 2022-08-29 ASSESSMENT — TONOMETRY
OD_IOP_MMHG: 14
OS_IOP_MMHG: 15

## 2022-11-08 ENCOUNTER — CLINIC PROCEDURE ONLY (OUTPATIENT)
Dept: URBAN - METROPOLITAN AREA CLINIC 26 | Facility: CLINIC | Age: 87
End: 2022-11-08

## 2022-11-08 DIAGNOSIS — G45.3: ICD-10-CM

## 2022-11-08 DIAGNOSIS — H35.3122: ICD-10-CM

## 2022-11-08 DIAGNOSIS — H35.3211: ICD-10-CM

## 2022-11-08 PROCEDURE — 92134 CPTRZ OPH DX IMG PST SGM RTA: CPT

## 2022-11-08 PROCEDURE — 92250 FUNDUS PHOTOGRAPHY W/I&R: CPT

## 2022-11-08 PROCEDURE — 67028 INJECTION EYE DRUG: CPT

## 2022-11-08 ASSESSMENT — TONOMETRY: OD_IOP_MMHG: 15

## 2022-11-09 NOTE — PATIENT DISCUSSION
Macular pucker is also known as epiretinal membrane - this is a surface wrinkling of the retina - if progressive it can cause blurred vision, distorted size of images, and crooked lines.

## 2022-11-09 NOTE — PATIENT DISCUSSION
Glaucoma laser surgery SLT completed OD , IOP remains in target . Continue to monitor IOP after SLT.

## 2022-12-16 NOTE — PATIENT DISCUSSION
12/16/22: PT REMAINS ASX. OCT WITH SLIGHT PROGRESSION. PT WOULD LIKE TO WAIT AND PROVIDED PAMPHLET WITH INFO REGARDING SX. AG TO MONITOR AT HOME.

## 2022-12-16 NOTE — PATIENT DISCUSSION
12/16/22: SLIGHTLY PROGRESSED COMPARED TO LAST VISIT. Discussed future YAG LASER Capsulotomy with patient.

## 2022-12-20 NOTE — PATIENT DISCUSSION
IOP high teens . Recommend following up with [facility] for further care and Tx's .

## 2023-01-23 ENCOUNTER — CLINIC PROCEDURE ONLY (OUTPATIENT)
Dept: URBAN - METROPOLITAN AREA CLINIC 26 | Facility: CLINIC | Age: 88
End: 2023-01-23

## 2023-01-23 DIAGNOSIS — H35.3122: ICD-10-CM

## 2023-01-23 DIAGNOSIS — G45.3: ICD-10-CM

## 2023-01-23 DIAGNOSIS — H35.3211: ICD-10-CM

## 2023-01-23 PROCEDURE — 92134 CPTRZ OPH DX IMG PST SGM RTA: CPT

## 2023-01-23 PROCEDURE — 92250 FUNDUS PHOTOGRAPHY W/I&R: CPT

## 2023-01-23 PROCEDURE — 67028 INJECTION EYE DRUG: CPT

## 2023-01-23 ASSESSMENT — TONOMETRY: OD_IOP_MMHG: 11

## 2023-04-03 ENCOUNTER — FOLLOW UP (OUTPATIENT)
Dept: URBAN - METROPOLITAN AREA CLINIC 26 | Facility: CLINIC | Age: 88
End: 2023-04-03

## 2023-04-03 VITALS — WEIGHT: 170 LBS | BODY MASS INDEX: 22.53 KG/M2 | HEIGHT: 73 IN

## 2023-04-03 DIAGNOSIS — H18.599: ICD-10-CM

## 2023-04-03 DIAGNOSIS — H35.3122: ICD-10-CM

## 2023-04-03 DIAGNOSIS — H04.123: ICD-10-CM

## 2023-04-03 DIAGNOSIS — H35.3211: ICD-10-CM

## 2023-04-03 DIAGNOSIS — G45.3: ICD-10-CM

## 2023-04-03 DIAGNOSIS — H40.1111: ICD-10-CM

## 2023-04-03 PROCEDURE — 92134 CPTRZ OPH DX IMG PST SGM RTA: CPT

## 2023-04-03 PROCEDURE — 92014 COMPRE OPH EXAM EST PT 1/>: CPT

## 2023-04-03 PROCEDURE — 67028 INJECTION EYE DRUG: CPT

## 2023-04-03 PROCEDURE — 92250 FUNDUS PHOTOGRAPHY W/I&R: CPT

## 2023-04-03 ASSESSMENT — TONOMETRY
OS_IOP_MMHG: 14
OD_IOP_MMHG: 12

## 2023-04-03 ASSESSMENT — VISUAL ACUITY
OD_SC: 20/25-2
OS_SC: 20/25-2

## 2023-06-12 ENCOUNTER — CLINIC PROCEDURE ONLY (OUTPATIENT)
Dept: URBAN - METROPOLITAN AREA CLINIC 26 | Facility: CLINIC | Age: 88
End: 2023-06-12

## 2023-06-12 DIAGNOSIS — G45.3: ICD-10-CM

## 2023-06-12 DIAGNOSIS — H35.3211: ICD-10-CM

## 2023-06-12 DIAGNOSIS — H35.3122: ICD-10-CM

## 2023-06-12 PROCEDURE — 92134 CPTRZ OPH DX IMG PST SGM RTA: CPT

## 2023-06-12 PROCEDURE — 67028 INJECTION EYE DRUG: CPT

## 2023-06-12 PROCEDURE — 92250 FUNDUS PHOTOGRAPHY W/I&R: CPT

## 2023-06-12 ASSESSMENT — TONOMETRY: OD_IOP_MMHG: 20

## 2023-07-19 ENCOUNTER — TELEPHONE ENCOUNTER (OUTPATIENT)
Dept: URBAN - METROPOLITAN AREA CLINIC 64 | Facility: CLINIC | Age: 88
End: 2023-07-19

## 2023-08-17 ENCOUNTER — OFFICE VISIT (OUTPATIENT)
Dept: URBAN - METROPOLITAN AREA CLINIC 63 | Facility: CLINIC | Age: 88
End: 2023-08-17

## 2023-08-21 ENCOUNTER — CLINIC PROCEDURE ONLY (OUTPATIENT)
Dept: URBAN - METROPOLITAN AREA CLINIC 26 | Facility: CLINIC | Age: 88
End: 2023-08-21

## 2023-08-21 DIAGNOSIS — G45.3: ICD-10-CM

## 2023-08-21 DIAGNOSIS — H35.3122: ICD-10-CM

## 2023-08-21 DIAGNOSIS — H35.3211: ICD-10-CM

## 2023-08-21 PROCEDURE — 92250 FUNDUS PHOTOGRAPHY W/I&R: CPT

## 2023-08-21 PROCEDURE — 67028 INJECTION EYE DRUG: CPT

## 2023-08-21 PROCEDURE — 92134 CPTRZ OPH DX IMG PST SGM RTA: CPT

## 2023-08-21 ASSESSMENT — TONOMETRY: OD_IOP_MMHG: 08

## 2023-10-30 ENCOUNTER — COMPREHENSIVE EXAM (OUTPATIENT)
Dept: URBAN - METROPOLITAN AREA CLINIC 26 | Facility: CLINIC | Age: 88
End: 2023-10-30

## 2023-10-30 DIAGNOSIS — H04.123: ICD-10-CM

## 2023-10-30 DIAGNOSIS — H40.1111: ICD-10-CM

## 2023-10-30 DIAGNOSIS — H35.3122: ICD-10-CM

## 2023-10-30 DIAGNOSIS — G45.3: ICD-10-CM

## 2023-10-30 DIAGNOSIS — H18.599: ICD-10-CM

## 2023-10-30 DIAGNOSIS — H35.3211: ICD-10-CM

## 2023-10-30 PROCEDURE — 92014 COMPRE OPH EXAM EST PT 1/>: CPT | Mod: 25

## 2023-10-30 PROCEDURE — 92134 CPTRZ OPH DX IMG PST SGM RTA: CPT

## 2023-10-30 PROCEDURE — 67028 INJECTION EYE DRUG: CPT

## 2023-10-30 PROCEDURE — 92250 FUNDUS PHOTOGRAPHY W/I&R: CPT | Mod: 59,59

## 2023-10-30 ASSESSMENT — VISUAL ACUITY
OD_SC: 20/50-1
OS_SC: 20/40-1

## 2023-10-30 ASSESSMENT — TONOMETRY
OD_IOP_MMHG: 14
OS_IOP_MMHG: 15

## 2024-01-09 ENCOUNTER — CLINIC PROCEDURE ONLY (OUTPATIENT)
Dept: URBAN - METROPOLITAN AREA CLINIC 26 | Facility: CLINIC | Age: 89
End: 2024-01-09

## 2024-01-09 DIAGNOSIS — H35.3211: ICD-10-CM

## 2024-01-09 DIAGNOSIS — H35.3122: ICD-10-CM

## 2024-01-09 PROCEDURE — 92250 FUNDUS PHOTOGRAPHY W/I&R: CPT

## 2024-01-09 PROCEDURE — 92134 CPTRZ OPH DX IMG PST SGM RTA: CPT

## 2024-01-09 PROCEDURE — 67028 INJECTION EYE DRUG: CPT

## 2024-01-09 ASSESSMENT — TONOMETRY: OD_IOP_MMHG: 11

## 2024-03-26 ENCOUNTER — CLINIC PROCEDURE ONLY (OUTPATIENT)
Dept: URBAN - METROPOLITAN AREA CLINIC 26 | Facility: CLINIC | Age: 89
End: 2024-03-26

## 2024-03-26 DIAGNOSIS — H35.3122: ICD-10-CM

## 2024-03-26 DIAGNOSIS — H35.3211: ICD-10-CM

## 2024-03-26 PROCEDURE — 92134 CPTRZ OPH DX IMG PST SGM RTA: CPT

## 2024-03-26 PROCEDURE — 67028 INJECTION EYE DRUG: CPT

## 2024-07-01 ENCOUNTER — CLINIC PROCEDURE ONLY (OUTPATIENT)
Dept: URBAN - METROPOLITAN AREA CLINIC 26 | Facility: CLINIC | Age: 89
End: 2024-07-01

## 2024-07-01 DIAGNOSIS — H35.3211: ICD-10-CM

## 2024-07-01 DIAGNOSIS — H35.3122: ICD-10-CM

## 2024-07-01 DIAGNOSIS — G45.3: ICD-10-CM

## 2024-07-01 PROCEDURE — 67028 INJECTION EYE DRUG: CPT

## 2024-07-01 PROCEDURE — 92250 FUNDUS PHOTOGRAPHY W/I&R: CPT | Mod: 59

## 2024-07-01 PROCEDURE — 92134 CPTRZ OPH DX IMG PST SGM RTA: CPT

## 2024-07-01 ASSESSMENT — TONOMETRY: OD_IOP_MMHG: 13

## 2024-09-30 ENCOUNTER — FOLLOW UP (OUTPATIENT)
Dept: URBAN - METROPOLITAN AREA CLINIC 26 | Facility: CLINIC | Age: 89
End: 2024-09-30

## 2024-09-30 VITALS — WEIGHT: 150 LBS | HEIGHT: 72 IN | BODY MASS INDEX: 20.32 KG/M2

## 2024-09-30 DIAGNOSIS — H02.833: ICD-10-CM

## 2024-09-30 DIAGNOSIS — H04.123: ICD-10-CM

## 2024-09-30 DIAGNOSIS — H35.3211: ICD-10-CM

## 2024-09-30 DIAGNOSIS — H02.836: ICD-10-CM

## 2024-09-30 DIAGNOSIS — H25.12: ICD-10-CM

## 2024-09-30 DIAGNOSIS — H35.3122: ICD-10-CM

## 2024-09-30 DIAGNOSIS — G45.3: ICD-10-CM

## 2024-09-30 DIAGNOSIS — H18.599: ICD-10-CM

## 2024-09-30 DIAGNOSIS — H40.1111: ICD-10-CM

## 2024-09-30 PROCEDURE — 92014 COMPRE OPH EXAM EST PT 1/>: CPT | Mod: 25

## 2024-09-30 PROCEDURE — 92250 FUNDUS PHOTOGRAPHY W/I&R: CPT | Mod: 59

## 2024-09-30 PROCEDURE — 67028 INJECTION EYE DRUG: CPT

## 2024-09-30 PROCEDURE — 92134 CPTRZ OPH DX IMG PST SGM RTA: CPT

## 2025-01-29 ENCOUNTER — FOLLOW UP (OUTPATIENT)
Age: OVER 89
End: 2025-01-29

## 2025-01-29 VITALS — HEIGHT: 72 IN | BODY MASS INDEX: 20.32 KG/M2 | WEIGHT: 150 LBS

## 2025-01-29 DIAGNOSIS — H04.123: ICD-10-CM

## 2025-01-29 DIAGNOSIS — H02.836: ICD-10-CM

## 2025-01-29 DIAGNOSIS — H40.1111: ICD-10-CM

## 2025-01-29 DIAGNOSIS — H25.12: ICD-10-CM

## 2025-01-29 DIAGNOSIS — G45.3: ICD-10-CM

## 2025-01-29 DIAGNOSIS — H18.599: ICD-10-CM

## 2025-01-29 DIAGNOSIS — H35.3122: ICD-10-CM

## 2025-01-29 DIAGNOSIS — H02.833: ICD-10-CM

## 2025-01-29 DIAGNOSIS — H35.3211: ICD-10-CM

## 2025-01-29 PROCEDURE — 67028 INJECTION EYE DRUG: CPT

## 2025-01-29 PROCEDURE — 92250 FUNDUS PHOTOGRAPHY W/I&R: CPT | Mod: 59

## 2025-01-29 PROCEDURE — 92014 COMPRE OPH EXAM EST PT 1/>: CPT | Mod: 25

## 2025-01-29 PROCEDURE — 92134 CPTRZ OPH DX IMG PST SGM RTA: CPT

## 2025-05-05 ENCOUNTER — CLINIC PROCEDURE ONLY (OUTPATIENT)
Age: OVER 89
End: 2025-05-05

## 2025-05-05 DIAGNOSIS — H35.3122: ICD-10-CM

## 2025-05-05 DIAGNOSIS — G45.3: ICD-10-CM

## 2025-05-05 DIAGNOSIS — H35.3211: ICD-10-CM

## 2025-05-05 PROCEDURE — 67028 INJECTION EYE DRUG: CPT

## 2025-05-05 PROCEDURE — 92250 FUNDUS PHOTOGRAPHY W/I&R: CPT | Mod: 59

## 2025-05-05 PROCEDURE — 92134 CPTRZ OPH DX IMG PST SGM RTA: CPT
